# Patient Record
Sex: MALE | Race: WHITE | URBAN - NONMETROPOLITAN AREA
[De-identification: names, ages, dates, MRNs, and addresses within clinical notes are randomized per-mention and may not be internally consistent; named-entity substitution may affect disease eponyms.]

---

## 2018-09-25 ENCOUNTER — HISTORICAL (OUTPATIENT)
Dept: ADMINISTRATIVE | Facility: HOSPITAL | Age: 43
End: 2018-09-25

## 2020-08-21 ENCOUNTER — HISTORICAL (OUTPATIENT)
Dept: RADIOLOGY | Facility: HOSPITAL | Age: 45
End: 2020-08-21

## 2024-10-22 ENCOUNTER — OFFICE VISIT (OUTPATIENT)
Dept: FAMILY MEDICINE | Facility: CLINIC | Age: 49
End: 2024-10-22
Payer: COMMERCIAL

## 2024-10-22 VITALS
WEIGHT: 242.19 LBS | OXYGEN SATURATION: 99 % | DIASTOLIC BLOOD PRESSURE: 94 MMHG | HEIGHT: 67 IN | TEMPERATURE: 99 F | BODY MASS INDEX: 38.01 KG/M2 | HEART RATE: 83 BPM | SYSTOLIC BLOOD PRESSURE: 148 MMHG

## 2024-10-22 DIAGNOSIS — Z12.11 SCREENING FOR COLON CANCER: ICD-10-CM

## 2024-10-22 DIAGNOSIS — E78.2 MIXED HYPERLIPIDEMIA: ICD-10-CM

## 2024-10-22 DIAGNOSIS — E66.01 CLASS 2 SEVERE OBESITY DUE TO EXCESS CALORIES WITH SERIOUS COMORBIDITY AND BODY MASS INDEX (BMI) OF 37.0 TO 37.9 IN ADULT: ICD-10-CM

## 2024-10-22 DIAGNOSIS — E66.812 CLASS 2 SEVERE OBESITY DUE TO EXCESS CALORIES WITH SERIOUS COMORBIDITY AND BODY MASS INDEX (BMI) OF 37.0 TO 37.9 IN ADULT: ICD-10-CM

## 2024-10-22 DIAGNOSIS — R03.0 ELEVATED BLOOD PRESSURE READING WITHOUT DIAGNOSIS OF HYPERTENSION: ICD-10-CM

## 2024-10-22 DIAGNOSIS — Z00.01 ENCOUNTER FOR WELL ADULT EXAM WITH ABNORMAL FINDINGS: Primary | ICD-10-CM

## 2024-10-22 PROBLEM — E66.09 OBESITY DUE TO EXCESS CALORIES WITH SERIOUS COMORBIDITY: Status: ACTIVE | Noted: 2024-10-22

## 2024-10-22 PROCEDURE — 1159F MED LIST DOCD IN RCRD: CPT | Mod: CPTII,,, | Performed by: FAMILY MEDICINE

## 2024-10-22 PROCEDURE — 1160F RVW MEDS BY RX/DR IN RCRD: CPT | Mod: CPTII,,, | Performed by: FAMILY MEDICINE

## 2024-10-22 PROCEDURE — 3077F SYST BP >= 140 MM HG: CPT | Mod: CPTII,,, | Performed by: FAMILY MEDICINE

## 2024-10-22 PROCEDURE — 3008F BODY MASS INDEX DOCD: CPT | Mod: CPTII,,, | Performed by: FAMILY MEDICINE

## 2024-10-22 PROCEDURE — 3080F DIAST BP >= 90 MM HG: CPT | Mod: CPTII,,, | Performed by: FAMILY MEDICINE

## 2024-10-22 PROCEDURE — 3044F HG A1C LEVEL LT 7.0%: CPT | Mod: CPTII,,, | Performed by: FAMILY MEDICINE

## 2024-10-22 PROCEDURE — 99386 PREV VISIT NEW AGE 40-64: CPT | Mod: ,,, | Performed by: FAMILY MEDICINE

## 2024-10-22 NOTE — ASSESSMENT & PLAN NOTE
Therapeutic lifestyle changes to include regular exercise with a target heart rate in zone 2     If we can get his blood pressure controlled we can consider phentermine for weight loss and consider topiramate in combination     We could also consider GLP 1 agonist therapy if it is cost effective for the patient

## 2024-10-22 NOTE — ASSESSMENT & PLAN NOTE
Home blood pressure monitoring discussed in detail with written instructions provided to the patient     He will monitor his blood pressures at home and return in 4 weeks

## 2024-10-22 NOTE — ASSESSMENT & PLAN NOTE
Healthy lifestyle choices as outlined below     Colorectal cancer screening discussed in detail with the patient and he is not interested in colonoscopy.  He does report that he will perform Cologuard testing and we discussed its limitations.

## 2024-10-22 NOTE — ASSESSMENT & PLAN NOTE
Therapeutic lifestyle changes to include low-fat diet and regular moderate intensity cardiovascular exercise

## 2024-10-22 NOTE — PROGRESS NOTES
"SUBJECTIVE:  HPI    Ruben Valle is a 49 y.o. male here for New Patient.     Here to reestablish care.  He has not been seen in greater than 8 years.    He denies any significant family history other than his mom having hyperlipidemia.    He rarely checks his blood pressures.  He does request something to help assist with some weight loss today.  He had really good success with phentermine in the past.    Ruben's allergies, medications, history, and problem list were updated as appropriate.    ROS:  Pertinent ROS as above, otherwise negative 12 point review of systems    OBJECTIVE:  Vital signs  Visit Vitals  BP (!) 148/94 (BP Location: Right arm, Patient Position: Sitting)   Pulse 83   Temp 98.5 °F (36.9 °C) (Temporal)   Ht 5' 7.32" (1.71 m)   Wt 109.9 kg (242 lb 3.2 oz)   SpO2 99%   BMI 37.57 kg/m²          PHYSICAL EXAM:  General: Awake, alert, no acute distress  ENT:  External auditory canals normal bilaterally .  Tympanic membranes normal bilaterally.  Oropharynx clear.    Neck:  No bruits, no masses  Cardiovascular:  Regular rhythm.  Normal rate.  No murmurs.  Respiratory: Clear to auscultation bilaterally, normal effort  Abdomen: Soft, nontender, nondistended, no hepatosplenomegaly   Extremities:  No cyanosis, no clubbing, no edema  Skin:  No rashes or appreciable lesions   Neuro:  Cranial nerves 2-12 are intact with usual testing.  Gait is normal.  Moves all 4 extremities equally and symmetrically.  Psychiatric:  Normal mood and affect.    Labs from October 1, 2024 reviewed with significant findings of triglyceride level of 178,     ASSESSMENT/PLAN:  1. Encounter for well adult exam with abnormal findings  Assessment & Plan:  Healthy lifestyle choices as outlined below     Colorectal cancer screening discussed in detail with the patient and he is not interested in colonoscopy.  He does report that he will perform Cologuard testing and we discussed its limitations.      2. Mixed " "hyperlipidemia  Overview:  Lab Results   Component Value Date    CHOL 195 10/01/2024     Lab Results   Component Value Date    HDL 42 10/01/2024     Lab Results   Component Value Date    LDLCALC 121 (H) 10/01/2024     Lab Results   Component Value Date    TRIG 178 (H) 10/01/2024       No results found for: "CHOLHDL"      Assessment & Plan:  Therapeutic lifestyle changes to include low-fat diet and regular moderate intensity cardiovascular exercise      3. Screening for colon cancer  -     Cologuard Screening (Multitarget Stool DNA); Future; Expected date: 10/22/2024    4. Elevated blood pressure reading without diagnosis of hypertension  Assessment & Plan:  Home blood pressure monitoring discussed in detail with written instructions provided to the patient     He will monitor his blood pressures at home and return in 4 weeks      5. Class 2 severe obesity due to excess calories with serious comorbidity and body mass index (BMI) of 37.0 to 37.9 in adult  Assessment & Plan:  Therapeutic lifestyle changes to include regular exercise with a target heart rate in zone 2     If we can get his blood pressure controlled we can consider phentermine for weight loss and consider topiramate in combination     We could also consider GLP 1 agonist therapy if it is cost effective for the patient        Follow Up:  Follow up in about 4 weeks (around 11/19/2024) for Elevated blood pressure reading.          "

## 2024-10-22 NOTE — PATIENT INSTRUCTIONS
Instructions for checking blood pressure at home:  No coffee or caffeinated beverages within 2 hours.    Empty bladder.  Seated position with feet flat on the floor.  Cuff at the level of the heart with arm gently hanging at side.  Notify us if blood pressures remain greater than 130 on the top or greater than 80 on the bottom.     Omron blood pressure cuff with Bluetooth capability

## 2024-11-12 ENCOUNTER — TELEPHONE (OUTPATIENT)
Dept: FAMILY MEDICINE | Facility: CLINIC | Age: 49
End: 2024-11-12
Payer: COMMERCIAL

## 2024-11-13 ENCOUNTER — PATIENT MESSAGE (OUTPATIENT)
Dept: RESEARCH | Facility: HOSPITAL | Age: 49
End: 2024-11-13
Payer: COMMERCIAL

## 2024-11-19 ENCOUNTER — OFFICE VISIT (OUTPATIENT)
Dept: FAMILY MEDICINE | Facility: CLINIC | Age: 49
End: 2024-11-19
Payer: COMMERCIAL

## 2024-11-19 VITALS
WEIGHT: 239.63 LBS | HEART RATE: 71 BPM | HEIGHT: 67 IN | BODY MASS INDEX: 37.61 KG/M2 | OXYGEN SATURATION: 97 % | DIASTOLIC BLOOD PRESSURE: 88 MMHG | TEMPERATURE: 99 F | SYSTOLIC BLOOD PRESSURE: 130 MMHG

## 2024-11-19 DIAGNOSIS — E66.01 CLASS 2 SEVERE OBESITY DUE TO EXCESS CALORIES WITH SERIOUS COMORBIDITY AND BODY MASS INDEX (BMI) OF 37.0 TO 37.9 IN ADULT: ICD-10-CM

## 2024-11-19 DIAGNOSIS — E66.812 CLASS 2 SEVERE OBESITY DUE TO EXCESS CALORIES WITH SERIOUS COMORBIDITY AND BODY MASS INDEX (BMI) OF 37.0 TO 37.9 IN ADULT: ICD-10-CM

## 2024-11-19 DIAGNOSIS — R03.0 ELEVATED BLOOD PRESSURE READING WITHOUT DIAGNOSIS OF HYPERTENSION: Primary | ICD-10-CM

## 2024-11-19 PROBLEM — Z00.01 ENCOUNTER FOR WELL ADULT EXAM WITH ABNORMAL FINDINGS: Status: RESOLVED | Noted: 2024-10-22 | Resolved: 2024-11-19

## 2024-11-19 PROCEDURE — 3079F DIAST BP 80-89 MM HG: CPT | Mod: CPTII,,, | Performed by: FAMILY MEDICINE

## 2024-11-19 PROCEDURE — 1159F MED LIST DOCD IN RCRD: CPT | Mod: CPTII,,, | Performed by: FAMILY MEDICINE

## 2024-11-19 PROCEDURE — 99213 OFFICE O/P EST LOW 20 MIN: CPT | Mod: ,,, | Performed by: FAMILY MEDICINE

## 2024-11-19 PROCEDURE — 3075F SYST BP GE 130 - 139MM HG: CPT | Mod: CPTII,,, | Performed by: FAMILY MEDICINE

## 2024-11-19 PROCEDURE — 3008F BODY MASS INDEX DOCD: CPT | Mod: CPTII,,, | Performed by: FAMILY MEDICINE

## 2024-11-19 PROCEDURE — 3044F HG A1C LEVEL LT 7.0%: CPT | Mod: CPTII,,, | Performed by: FAMILY MEDICINE

## 2024-11-19 PROCEDURE — 1160F RVW MEDS BY RX/DR IN RCRD: CPT | Mod: CPTII,,, | Performed by: FAMILY MEDICINE

## 2024-11-19 RX ORDER — FLUOROMETHOLONE 1 MG/ML
1 SUSPENSION/ DROPS OPHTHALMIC 2 TIMES DAILY
COMMUNITY
Start: 2024-10-25

## 2024-11-19 RX ORDER — PHENTERMINE HYDROCHLORIDE 37.5 MG/1
37.5 TABLET ORAL
Qty: 30 TABLET | Refills: 2 | Status: SHIPPED | OUTPATIENT
Start: 2024-11-19 | End: 2025-02-17

## 2024-11-19 NOTE — PROGRESS NOTES
"SUBJECTIVE:  HPI    Ruben Valle is a 49 y.o. male here for Follow-up (Blood pressure).     Since his last visit, he cut back on his coffee and energy drinks and gradually his blood pressure improved with the elimination of caffeine in his diet.  He is now down to 1 cup of coffee per day.  His blood pressures have been normal at home.    He denies any chest pain or shortness on breath.      He has not really interested in GLP 1 agonist injections for weight loss.  Phentermine has worked well for him for weight loss in the past.    He has begun exercising more regularly with a target heart rate and zone 2.      Ruben's allergies, medications, history, and problem list were updated as appropriate.    ROS:  Pertinent ROS as above, otherwise negative    OBJECTIVE:  Vital signs  Visit Vitals  /88 (BP Location: Left arm)   Pulse 71   Temp 98.8 °F (37.1 °C) (Temporal)   Ht 5' 7.32" (1.71 m)   Wt 108.7 kg (239 lb 9.6 oz)   SpO2 97%   BMI 37.17 kg/m²          PHYSICAL EXAM:  General: Awake, alert, no acute distress, weight down 3 lb in the last month      ASSESSMENT/PLAN:  1. Elevated blood pressure reading without diagnosis of hypertension  Assessment & Plan:  Improved with elimination of caffeine     Continue regular exercise and monitoring at home      2. Class 2 severe obesity due to excess calories with serious comorbidity and body mass index (BMI) of 37.0 to 37.9 in adult  -     phentermine (ADIPEX-P) 37.5 mg tablet; Take 1 tablet (37.5 mg total) by mouth before breakfast.  Dispense: 30 tablet; Refill: 2      Follow Up:  Follow up in about 3 months (around 2/19/2025) for Blood pressure and weight.          "

## 2025-03-03 DIAGNOSIS — E66.01 CLASS 2 SEVERE OBESITY DUE TO EXCESS CALORIES WITH SERIOUS COMORBIDITY AND BODY MASS INDEX (BMI) OF 37.0 TO 37.9 IN ADULT: ICD-10-CM

## 2025-03-03 DIAGNOSIS — E66.812 CLASS 2 SEVERE OBESITY DUE TO EXCESS CALORIES WITH SERIOUS COMORBIDITY AND BODY MASS INDEX (BMI) OF 37.0 TO 37.9 IN ADULT: ICD-10-CM

## 2025-03-03 RX ORDER — PHENTERMINE HYDROCHLORIDE 37.5 MG/1
37.5 TABLET ORAL
Qty: 30 TABLET | Refills: 2 | Status: SHIPPED | OUTPATIENT
Start: 2025-03-03 | End: 2025-06-01